# Patient Record
Sex: FEMALE | Race: ASIAN | NOT HISPANIC OR LATINO
[De-identification: names, ages, dates, MRNs, and addresses within clinical notes are randomized per-mention and may not be internally consistent; named-entity substitution may affect disease eponyms.]

---

## 2016-12-30 NOTE — ED CLERICAL - NS ED CLERK NOTE PRE-ARRIVAL INFORMATION; ADDITIONAL PRE-ARRIVAL INFORMATION
went to [facility] for gastric sleeve, pna, internal hemmorhage, signed out ama and flew home, white count 13,000, on levaquin, abcess on incision site

## 2016-12-30 NOTE — ED PROVIDER NOTE - OBJECTIVE STATEMENT
46yF with recent bariatric surgery in [place] (12/8) sent in by [facility] MD for concern for possible abdominal abscess. Patient reports several days of fever now with drainage at surgical site. Had complicated hospital course in [place] including ICU stay.

## 2016-12-30 NOTE — ED ADULT NURSE NOTE - OBJECTIVE STATEMENT
47 y/o female presents to the ED a&ox3 with c/o drainage around abdominal abscess  s/p gastric sleeve 12/8 in [place]. Pt since then has been having fever and diagnosed with pneumonia mid December. Returned from [place] 12/23. Pt seen PMD yesterday and sent in for ED. Respirations even and nonlabored. Lungs cta b/l. Denies any cp/sob. Abd soft nt nd +bsx4. +pulses +Cap refill. Low grade 99.2. No visible drainage around surgical site.

## 2016-12-30 NOTE — ED PROVIDER NOTE - PROGRESS NOTE DETAILS
Attd:  Patient signed out pending CT A/P and surgical evaluation.  CT demonstrated umbilical fat containing hernia, also perisplenic fluid collection - hematoma versus abscess.  Surgery evaluated patient in ED, will admit to their service for further workup of perisplenic collection.  Berhane Montanez M.D.

## 2016-12-30 NOTE — ED PROVIDER NOTE - MEDICAL DECISION MAKING DETAILS
ATTD: abd pain with abscess , concern for worsening abscess. check labs, check ct a/p, no pain currently and does not want pain medication.

## 2016-12-30 NOTE — ED PROVIDER NOTE - ATTENDING CONTRIBUTION TO CARE
47 y/o f with pmhx denies, presents for concern of abd pain and drainage from surgical site. Patient had bariatric surg in [place] on dec. 8th with complicated course leading to ICU admission and prolonged hospital stay discharged on dec. 21 AMA as patient wanted [place] come back to [place] and have more care. She was  seen by her pmd yesterday and sent to Er but waited until today to come in. Dr. Joan Mckay saw patient in office, sent over results. noetd to have high platelets and high protein, left pleural effusion. per patient and notes from MD patient was having fevers, .  had multiple transfusions as well during her hospital stay.   Gen. no acute distress, Non toxic   HEENT: EOMI, mmm,   Lungs: CTAB/L no C/ W /R   CVS: S1S2   Abd; Soft non tender, non distended   Ext: no edema   Neuro AAOx3 non focal clear speech

## 2016-12-31 NOTE — H&P ADULT. - HISTORY OF PRESENT ILLNESS
46 F presents with perisplenic collection. Patient underwent a single-incision laparoscopic sleeve gastrectomy in [place] on 12/8/16. Supposedly had a fall in the hospital. Was then discharged. Patient then returned to that hospital with SOB. Found to have L moderate pleural effusion and L subdiaphragmatic hemorrhagic collection. Underwent an ultrasound-guided aspiration of the L pleural effusion. Also received 1 U PRBC and 5 U FFP. Patient then left [facility] and returned to the [place] last week. She then saw her PCP, who noted a bump at her umbilicus, started her on Levaquin and sent her to the hospital for evaluation. Currently, she denies any fever or chills. Has been tolerating a diet. Denies abdominal pain, nausea, vomiting.

## 2016-12-31 NOTE — H&P ADULT. - GASTROINTESTINAL COMMENTS
Soft, ND, NTP. Lump noted above umbilicus. No skin changes. No drainage. Small incision in LUQ appears clean.

## 2016-12-31 NOTE — H&P ADULT. - ASSESSMENT
46 F s/p sleeve gastrectomy in [place] on 12/8/16. Developed a L pleural effusion and a subdiaphragmatic L hemorrhagic collection. Had ultrasound aspiration of L pleural effusion in [place]. Now presents with CT findings of a perisplenic collection, possibly an abscess. Possibly could be due to a leak. Will admit to [facility] ([facility]). Will start antibiotics.  -NPO  -IVF  -VTE prophylaxis  -Start IV antibiotics  -Will get upper GI  -D/w fellow  EDGARDO Parrish

## 2017-01-02 ENCOUNTER — TRANSCRIPTION ENCOUNTER (OUTPATIENT)
Age: 47
End: 2017-01-02

## 2017-01-02 NOTE — DISCHARGE NOTE ADULT - CARE PROVIDER_API CALL
Dennys Hall (MD), Surgery  [address] [place] [address]  Phone: [phone]  Fax: [phone]

## 2017-01-02 NOTE — DISCHARGE NOTE ADULT - PLAN OF CARE
complete antibiotics Interventional Radiology in one week.  Call their office [phone] for appointment, and schedule necessary imaging ie: CT scan of abdomen and pelvis if necessary, prior to follow up.  Please discuss with their office when any necessary imaging should be scheduled, when calling to schedule your follow up office appointment.    You will be discharged with ROYCE drain. You will need to empty them at least once daily and record the outputs accurately. This will be taught to you by the nursing staff.   Please do not remove the ROYCE drain.  Please empty and record output daily, and discard contents.  Please bring recorded results to your follow up appointment.  They will be removed in the office when clinically indicated.   Please bring to the office, the recorded results of the daily recorded output.    Follow up with Dr. Hall in 2 weeks.  Call [phone] for appointment.  Notify your surgeon and return to ER for temperatures greater than 101, chills sweats, pain not controlled with pain medications, persistent nausea and vomiting, or acutely concerning matters to you, that may require urgent medical attention.    Interventional Radiology in one week.  Call their office [phone] for appointment, and schedule necessary imaging ie: CT scan of abdomen and pelvis if necessary, prior to follow up.    You will be discharged with ROYCE drain. You will need to empty them at least once daily and record the outputs accurately. This will be taught to you by the nursing staff.   Please do not remove the ROYCE drain.  Please empty and record output daily, and discard contents.  Please bring recorded results to your follow up appointment.  They will be removed in the office when clinically indicated.   Please bring to the office, the recorded results of the daily recorded output. follow up Please follow up with your Primary Care Physician regarding your hospitalization, and schedule an appointment within two weeks after your discharge to review your hospital course.  Please  review all your current medications, and dose adjust as prescribed by your Primary Care Physician.  Call their office for appointment. complete antibiotics and follow up

## 2017-01-02 NOTE — DISCHARGE NOTE ADULT - HOSPITAL COURSE
46 F presents with perisplenic collection. Patient underwent a single-incision laparoscopic sleeve gastrectomy in [place] on 12/8/16. Supposedly had a fall in the hospital. Was then discharged. Patient then returned to that hospital with SOB. Found to have L moderate pleural effusion and L subdiaphragmatic hemorrhagic collection. Underwent an ultrasound-guided aspiration of the L pleural effusion. Also received 1 U PRBC and 5 U FFP. Patient then left [facility] and returned to the [place] last week. She then saw her PCP, who noted a bump at her umbilicus, started her on Levaquin and sent her to the hospital for evaluation. Currently, she denies any fever or chills. Has been tolerating a diet. Denies abdominal pain, nausea, vomiting.    Pt was admitted for symptomatic relief, pain control, IV fluids and further evaluation and treatment.  CT abd/pelvis showed nonspecific perisplenic collection. Concerning for abscess. Additional collections adjacent to liver.  Pt was placed on IV antibiotics.  On 12/31 she underwent percutaneous IR drainage, and tolerated procedure well.  Pt is tolerating a diet, voiding and ambulating.  Pt is ready for discharge in stable condition.  Pt will follow up with Dr. Hall, as an outpatient in one to two weeks and complete 7 days of oral antibiotics.

## 2017-01-02 NOTE — DISCHARGE NOTE ADULT - INSTRUCTIONS
Bariatric Phase I Full Liquid diet    Activity as tolerated. Avoid heavy lifting and heavy exercise. Follow up with MD. Monitor Drain site and record ROYCE drainage on sheet provided.

## 2017-01-02 NOTE — DISCHARGE NOTE ADULT - CARE PLAN
Principal Discharge DX:	Splenic abscess  Goal:	complete antibiotics  Instructions for follow-up, activity and diet:	Interventional Radiology in one week.  Call their office [phone] for appointment, and schedule necessary imaging ie: CT scan of abdomen and pelvis if necessary, prior to follow up.  Please discuss with their office when any necessary imaging should be scheduled, when calling to schedule your follow up office appointment.    You will be discharged with ROYCE drain. You will need to empty them at least once daily and record the outputs accurately. This will be taught to you by the nursing staff.   Please do not remove the ROYCE drain.  Please empty and record output daily, and discard contents.  Please bring recorded results to your follow up appointment.  They will be removed in the office when clinically indicated.   Please bring to the office, the recorded results of the daily recorded output.    Follow up with Dr. Hall in 2 weeks.  Call [phone] for appointment.  Notify your surgeon and return to ER for temperatures greater than 101, chills sweats, pain not controlled with pain medications, persistent nausea and vomiting, or acutely concerning matters to you, that may require urgent medical attention.    Interventional Radiology in one week.  Call their office [phone] for appointment, and schedule necessary imaging ie: CT scan of abdomen and pelvis if necessary, prior to follow up.    You will be discharged with ROYCE drain. You will need to empty them at least once daily and record the outputs accurately. This will be taught to you by the nursing staff.   Please do not remove the ROYCE drain.  Please empty and record output daily, and discard contents.  Please bring recorded results to your follow up appointment.  They will be removed in the office when clinically indicated.   Please bring to the office, the recorded results of the daily recorded output.  Secondary Diagnosis:	S/P laparoscopic sleeve gastrectomy  Goal:	follow up  Instructions for follow-up, activity and diet:	Please follow up with your Primary Care Physician regarding your hospitalization, and schedule an appointment within two weeks after your discharge to review your hospital course.  Please  review all your current medications, and dose adjust as prescribed by your Primary Care Physician.  Call their office for appointment.  Secondary Diagnosis:	OLINDA (obstructive sleep apnea)  Goal:	follow up  Instructions for follow-up, activity and diet:	Please follow up with your Primary Care Physician regarding your hospitalization, and schedule an appointment within two weeks after your discharge to review your hospital course.  Please  review all your current medications, and dose adjust as prescribed by your Primary Care Physician.  Call their office for appointment. Principal Discharge DX:	Splenic abscess  Goal:	complete antibiotics  Instructions for follow-up, activity and diet:	Interventional Radiology in one week.  Call their office [phone] for appointment, and schedule necessary imaging ie: CT scan of abdomen and pelvis if necessary, prior to follow up.  Please discuss with their office when any necessary imaging should be scheduled, when calling to schedule your follow up office appointment.    You will be discharged with ROYCE drain. You will need to empty them at least once daily and record the outputs accurately. This will be taught to you by the nursing staff.   Please do not remove the ROYCE drain.  Please empty and record output daily, and discard contents.  Please bring recorded results to your follow up appointment.  They will be removed in the office when clinically indicated.   Please bring to the office, the recorded results of the daily recorded output.    Follow up with Dr. Hall in 2 weeks.  Call [phone] for appointment.  Notify your surgeon and return to ER for temperatures greater than 101, chills sweats, pain not controlled with pain medications, persistent nausea and vomiting, or acutely concerning matters to you, that may require urgent medical attention.    Interventional Radiology in one week.  Call their office [phone] for appointment, and schedule necessary imaging ie: CT scan of abdomen and pelvis if necessary, prior to follow up.    You will be discharged with ROYCE drain. You will need to empty them at least once daily and record the outputs accurately. This will be taught to you by the nursing staff.   Please do not remove the ROYCE drain.  Please empty and record output daily, and discard contents.  Please bring recorded results to your follow up appointment.  They will be removed in the office when clinically indicated.   Please bring to the office, the recorded results of the daily recorded output.  Secondary Diagnosis:	S/P laparoscopic sleeve gastrectomy  Goal:	follow up  Instructions for follow-up, activity and diet:	Please follow up with your Primary Care Physician regarding your hospitalization, and schedule an appointment within two weeks after your discharge to review your hospital course.  Please  review all your current medications, and dose adjust as prescribed by your Primary Care Physician.  Call their office for appointment.  Secondary Diagnosis:	OLINDA (obstructive sleep apnea)  Goal:	follow up  Instructions for follow-up, activity and diet:	Please follow up with your Primary Care Physician regarding your hospitalization, and schedule an appointment within two weeks after your discharge to review your hospital course.  Please  review all your current medications, and dose adjust as prescribed by your Primary Care Physician.  Call their office for appointment. Principal Discharge DX:	Splenic abscess  Goal:	complete antibiotics and follow up  Instructions for follow-up, activity and diet:	Interventional Radiology in one week.  Call their office [phone] for appointment, and schedule necessary imaging ie: CT scan of abdomen and pelvis if necessary, prior to follow up.  Please discuss with their office when any necessary imaging should be scheduled, when calling to schedule your follow up office appointment.    You will be discharged with ROYCE drain. You will need to empty them at least once daily and record the outputs accurately. This will be taught to you by the nursing staff.   Please do not remove the ROYCE drain.  Please empty and record output daily, and discard contents.  Please bring recorded results to your follow up appointment.  They will be removed in the office when clinically indicated.   Please bring to the office, the recorded results of the daily recorded output.    Follow up with Dr. Hall in 2 weeks.  Call [phone] for appointment.  Notify your surgeon and return to ER for temperatures greater than 101, chills sweats, pain not controlled with pain medications, persistent nausea and vomiting, or acutely concerning matters to you, that may require urgent medical attention.    Interventional Radiology in one week.  Call their office [phone] for appointment, and schedule necessary imaging ie: CT scan of abdomen and pelvis if necessary, prior to follow up.    You will be discharged with ROYCE drain. You will need to empty them at least once daily and record the outputs accurately. This will be taught to you by the nursing staff.   Please do not remove the ROYCE drain.  Please empty and record output daily, and discard contents.  Please bring recorded results to your follow up appointment.  They will be removed in the office when clinically indicated.   Please bring to the office, the recorded results of the daily recorded output.  Secondary Diagnosis:	S/P laparoscopic sleeve gastrectomy  Goal:	follow up  Instructions for follow-up, activity and diet:	Please follow up with your Primary Care Physician regarding your hospitalization, and schedule an appointment within two weeks after your discharge to review your hospital course.  Please  review all your current medications, and dose adjust as prescribed by your Primary Care Physician.  Call their office for appointment.  Secondary Diagnosis:	OLINDA (obstructive sleep apnea)  Goal:	follow up  Instructions for follow-up, activity and diet:	Please follow up with your Primary Care Physician regarding your hospitalization, and schedule an appointment within two weeks after your discharge to review your hospital course.  Please  review all your current medications, and dose adjust as prescribed by your Primary Care Physician.  Call their office for appointment. Principal Discharge DX:	Splenic abscess  Goal:	complete antibiotics and follow up  Instructions for follow-up, activity and diet:	Interventional Radiology in one week.  Call their office [phone] for appointment, and schedule necessary imaging ie: CT scan of abdomen and pelvis if necessary, prior to follow up.  Please discuss with their office when any necessary imaging should be scheduled, when calling to schedule your follow up office appointment.    You will be discharged with ROYCE drain. You will need to empty them at least once daily and record the outputs accurately. This will be taught to you by the nursing staff.   Please do not remove the ROYCE drain.  Please empty and record output daily, and discard contents.  Please bring recorded results to your follow up appointment.  They will be removed in the office when clinically indicated.   Please bring to the office, the recorded results of the daily recorded output.    Follow up with Dr. Hall in 2 weeks.  Call [phone] for appointment.  Notify your surgeon and return to ER for temperatures greater than 101, chills sweats, pain not controlled with pain medications, persistent nausea and vomiting, or acutely concerning matters to you, that may require urgent medical attention.    Interventional Radiology in one week.  Call their office [phone] for appointment, and schedule necessary imaging ie: CT scan of abdomen and pelvis if necessary, prior to follow up.    You will be discharged with ROYCE drain. You will need to empty them at least once daily and record the outputs accurately. This will be taught to you by the nursing staff.   Please do not remove the ROYCE drain.  Please empty and record output daily, and discard contents.  Please bring recorded results to your follow up appointment.  They will be removed in the office when clinically indicated.   Please bring to the office, the recorded results of the daily recorded output.  Secondary Diagnosis:	S/P laparoscopic sleeve gastrectomy  Goal:	follow up  Instructions for follow-up, activity and diet:	Please follow up with your Primary Care Physician regarding your hospitalization, and schedule an appointment within two weeks after your discharge to review your hospital course.  Please  review all your current medications, and dose adjust as prescribed by your Primary Care Physician.  Call their office for appointment.  Secondary Diagnosis:	OLINDA (obstructive sleep apnea)  Goal:	follow up  Instructions for follow-up, activity and diet:	Please follow up with your Primary Care Physician regarding your hospitalization, and schedule an appointment within two weeks after your discharge to review your hospital course.  Please  review all your current medications, and dose adjust as prescribed by your Primary Care Physician.  Call their office for appointment. Principal Discharge DX:	Splenic abscess  Goal:	complete antibiotics and follow up  Instructions for follow-up, activity and diet:	Interventional Radiology in one week.  Call their office [phone] for appointment, and schedule necessary imaging ie: CT scan of abdomen and pelvis if necessary, prior to follow up.  Please discuss with their office when any necessary imaging should be scheduled, when calling to schedule your follow up office appointment.    You will be discharged with ROYCE drain. You will need to empty them at least once daily and record the outputs accurately. This will be taught to you by the nursing staff.   Please do not remove the ROYCE drain.  Please empty and record output daily, and discard contents.  Please bring recorded results to your follow up appointment.  They will be removed in the office when clinically indicated.   Please bring to the office, the recorded results of the daily recorded output.    Follow up with Dr. Hall in 2 weeks.  Call [phone] for appointment.  Notify your surgeon and return to ER for temperatures greater than 101, chills sweats, pain not controlled with pain medications, persistent nausea and vomiting, or acutely concerning matters to you, that may require urgent medical attention.    Interventional Radiology in one week.  Call their office [phone] for appointment, and schedule necessary imaging ie: CT scan of abdomen and pelvis if necessary, prior to follow up.    You will be discharged with ROYCE drain. You will need to empty them at least once daily and record the outputs accurately. This will be taught to you by the nursing staff.   Please do not remove the ROYCE drain.  Please empty and record output daily, and discard contents.  Please bring recorded results to your follow up appointment.  They will be removed in the office when clinically indicated.   Please bring to the office, the recorded results of the daily recorded output.  Secondary Diagnosis:	S/P laparoscopic sleeve gastrectomy  Goal:	follow up  Instructions for follow-up, activity and diet:	Please follow up with your Primary Care Physician regarding your hospitalization, and schedule an appointment within two weeks after your discharge to review your hospital course.  Please  review all your current medications, and dose adjust as prescribed by your Primary Care Physician.  Call their office for appointment.  Secondary Diagnosis:	OLINDA (obstructive sleep apnea)  Goal:	follow up  Instructions for follow-up, activity and diet:	Please follow up with your Primary Care Physician regarding your hospitalization, and schedule an appointment within two weeks after your discharge to review your hospital course.  Please  review all your current medications, and dose adjust as prescribed by your Primary Care Physician.  Call their office for appointment.

## 2017-01-02 NOTE — DISCHARGE NOTE ADULT - PATIENT PORTAL LINK FT
“You can access the [facility] Patient Portal, offered by [facility], by registering with the following website: [email or web address]”

## 2017-01-02 NOTE — DISCHARGE NOTE ADULT - OTHER SIGNIFICANT FINDINGS
See Radiographical Imaging Studies' results.    12/31 IR procedure Note:  SUCCESSFUL CT-GUIDED PERCUTANEOUS DRAINAGE OF PERISPLENIC COLLECTION WITH   8.3 [place] PIGTAIL CATHETER YIELDING APPROXIMATELY 3 CC 3 CC OF BLOODY   FLUID. SAMPLE OF FLUID SENT FOR MICROBIOLOGICAL ANAL

## 2017-01-02 NOTE — DISCHARGE NOTE ADULT - CARE PROVIDERS DIRECT ADDRESSES
[email or web address]

## 2017-01-02 NOTE — DISCHARGE NOTE ADULT - MEDICATION SUMMARY - MEDICATIONS TO TAKE
I will START or STAY ON the medications listed below when I get home from the hospital:    amoxicillin-clavulanate 875 mg-125 mg oral tablet  -- 1 tab(s) by mouth 2 times a day MDD:2  -- Finish all this medication unless otherwise directed by prescriber.  Take with food or milk.    -- Indication: For Antibiotic

## 2017-01-10 ENCOUNTER — FORM ENCOUNTER (OUTPATIENT)
Age: 47
End: 2017-01-10

## 2017-01-11 ENCOUNTER — OUTPATIENT (OUTPATIENT)
Dept: OUTPATIENT SERVICES | Facility: HOSPITAL | Age: 47
LOS: 1 days | End: 2017-01-11
Payer: MEDICAID

## 2017-01-11 ENCOUNTER — APPOINTMENT (OUTPATIENT)
Dept: CT IMAGING | Facility: HOSPITAL | Age: 47
End: 2017-01-11

## 2017-01-11 DIAGNOSIS — K65.1 PERITONEAL ABSCESS: ICD-10-CM

## 2017-01-11 DIAGNOSIS — Z98.84 BARIATRIC SURGERY STATUS: Chronic | ICD-10-CM

## 2017-01-11 PROCEDURE — 49424 ASSESS CYST CONTRAST INJECT: CPT

## 2017-01-11 PROCEDURE — 74150 CT ABDOMEN W/O CONTRAST: CPT | Mod: 26

## 2017-01-11 PROCEDURE — 76080 X-RAY EXAM OF FISTULA: CPT

## 2017-01-11 PROCEDURE — 76080 X-RAY EXAM OF FISTULA: CPT | Mod: 26

## 2017-01-11 PROCEDURE — 74150 CT ABDOMEN W/O CONTRAST: CPT

## 2017-01-17 DIAGNOSIS — K65.1 PERITONEAL ABSCESS: ICD-10-CM

## 2017-01-17 DIAGNOSIS — Z43.4 ENCOUNTER FOR ATTENTION TO OTHER ARTIFICIAL OPENINGS OF DIGESTIVE TRACT: ICD-10-CM

## 2017-01-24 ENCOUNTER — APPOINTMENT (OUTPATIENT)
Dept: SURGERY | Facility: CLINIC | Age: 47
End: 2017-01-24

## 2017-01-24 VITALS
RESPIRATION RATE: 15 BRPM | TEMPERATURE: 98.5 F | BODY MASS INDEX: 28.25 KG/M2 | WEIGHT: 149.5 LBS | SYSTOLIC BLOOD PRESSURE: 105 MMHG | HEART RATE: 90 BPM | DIASTOLIC BLOOD PRESSURE: 74 MMHG | OXYGEN SATURATION: 97 %

## 2017-01-24 DIAGNOSIS — R00.2 PALPITATIONS: ICD-10-CM

## 2017-01-24 DIAGNOSIS — G47.30 SLEEP APNEA, UNSPECIFIED: ICD-10-CM

## 2017-01-24 DIAGNOSIS — R60.0 LOCALIZED EDEMA: ICD-10-CM

## 2017-01-24 DIAGNOSIS — Z82.5 FAMILY HISTORY OF ASTHMA AND OTHER CHRONIC LOWER RESPIRATORY DISEASES: ICD-10-CM

## 2017-01-24 DIAGNOSIS — Z98.84 BARIATRIC SURGERY STATUS: ICD-10-CM

## 2017-03-24 ENCOUNTER — APPOINTMENT (OUTPATIENT)
Dept: SURGERY | Facility: CLINIC | Age: 47
End: 2017-03-24

## 2018-01-19 ENCOUNTER — APPOINTMENT (OUTPATIENT)
Dept: SURGERY | Facility: CLINIC | Age: 48
End: 2018-01-19

## 2018-03-12 ENCOUNTER — RX RENEWAL (OUTPATIENT)
Age: 48
End: 2018-03-12

## 2018-04-09 ENCOUNTER — APPOINTMENT (OUTPATIENT)
Dept: SURGERY | Facility: CLINIC | Age: 48
End: 2018-04-09
Payer: MEDICAID

## 2018-04-09 VITALS
TEMPERATURE: 98.1 F | RESPIRATION RATE: 16 BRPM | SYSTOLIC BLOOD PRESSURE: 129 MMHG | HEART RATE: 97 BPM | OXYGEN SATURATION: 97 % | DIASTOLIC BLOOD PRESSURE: 84 MMHG

## 2018-04-09 DIAGNOSIS — Z90.3 ACQUIRED ABSENCE OF STOMACH [PART OF]: ICD-10-CM

## 2018-04-09 DIAGNOSIS — K21.9 GASTRO-ESOPHAGEAL REFLUX DISEASE W/OUT ESOPHAGITIS: ICD-10-CM

## 2018-04-09 DIAGNOSIS — R05 COUGH: ICD-10-CM

## 2018-04-09 PROCEDURE — 99214 OFFICE O/P EST MOD 30 MIN: CPT

## 2018-04-09 RX ORDER — ESOMEPRAZOLE MAGNESIUM 40 MG/1
40 CAPSULE, DELAYED RELEASE ORAL DAILY
Qty: 90 | Refills: 0 | Status: ACTIVE | COMMUNITY
Start: 2018-04-09 | End: 1900-01-01

## 2018-04-09 RX ORDER — OMEPRAZOLE 40 MG/1
40 CAPSULE, DELAYED RELEASE ORAL TWICE DAILY
Qty: 60 | Refills: 9 | Status: ACTIVE | COMMUNITY
Start: 2017-01-24 | End: 1900-01-01

## 2018-04-09 RX ORDER — BUDESONIDE AND FORMOTEROL FUMARATE DIHYDRATE 160; 4.5 UG/1; UG/1
AEROSOL RESPIRATORY (INHALATION)
Refills: 0 | Status: DISCONTINUED | COMMUNITY
End: 2018-04-09

## 2018-04-09 RX ORDER — ALBUTEROL SULFATE 2.5 MG/3ML
0.08 VIAL, NEBULIZER (ML) INHALATION
Refills: 0 | Status: DISCONTINUED | COMMUNITY
End: 2018-04-09

## 2018-06-15 ENCOUNTER — APPOINTMENT (OUTPATIENT)
Dept: SURGERY | Facility: CLINIC | Age: 48
End: 2018-06-15

## 2018-07-09 ENCOUNTER — APPOINTMENT (OUTPATIENT)
Dept: SURGERY | Facility: CLINIC | Age: 48
End: 2018-07-09

## 2018-09-03 PROBLEM — R60.0 BILATERAL EDEMA OF LOWER EXTREMITY: Status: ACTIVE | Noted: 2017-01-24

## 2019-04-01 ENCOUNTER — APPOINTMENT (OUTPATIENT)
Dept: SURGERY | Facility: CLINIC | Age: 49
End: 2019-04-01

## 2020-09-11 ENCOUNTER — OUTPATIENT (OUTPATIENT)
Dept: OUTPATIENT SERVICES | Facility: HOSPITAL | Age: 50
LOS: 1 days | Discharge: HOME | End: 2020-09-11

## 2020-09-11 DIAGNOSIS — Z98.84 BARIATRIC SURGERY STATUS: Chronic | ICD-10-CM

## 2020-09-11 DIAGNOSIS — D64.9 ANEMIA, UNSPECIFIED: ICD-10-CM

## 2020-09-14 ENCOUNTER — APPOINTMENT (OUTPATIENT)
Dept: HEMATOLOGY ONCOLOGY | Facility: CLINIC | Age: 50
End: 2020-09-14
Payer: MEDICAID

## 2020-09-14 VITALS
WEIGHT: 140.63 LBS | DIASTOLIC BLOOD PRESSURE: 83 MMHG | BODY MASS INDEX: 25.88 KG/M2 | OXYGEN SATURATION: 99 % | TEMPERATURE: 98.2 F | SYSTOLIC BLOOD PRESSURE: 125 MMHG | HEART RATE: 95 BPM | HEIGHT: 61.93 IN | RESPIRATION RATE: 16 BRPM

## 2020-09-14 DIAGNOSIS — D50.9 IRON DEFICIENCY ANEMIA, UNSPECIFIED: ICD-10-CM

## 2020-09-14 PROCEDURE — 99204 OFFICE O/P NEW MOD 45 MIN: CPT

## 2020-09-14 NOTE — HISTORY OF PRESENT ILLNESS
[de-identified] : This is a 50 year old woman with a history of hyperthyroidism, now on surveillance, and off PTU, history of thalassemia minor. She had learned of this diagnosis after her niece was diagnosed with thalassemia major.  Baseline Hg 11.5 in 2016.  Patient had a bariatric surgery, Gastric sleeve in 2016, became severely anemic, Hg 6.4 back then and required transfusions, since that time she had a steadily declining hemoglobin in the 10's range and more recently on August 3rd came down to 9.7g/dl.   MCV marginally lower than baseline at 61fl\par 2 years ago hair started falling out.  Also red blood spots on the leg that came and went. Has pictures consistent with a folliculitis.  \par Does eat meat.  Has tried an iron pill on and off.  Has taken it back, was a 125mg liquid iron supplement.   While using this she is symptomatic with severe constipation. She manages to take it a couple of times a week. \par Menstrual periods stopped about a year ago.  \par \par She did suffer from COVID 19 infection in April. Was managed as an outpatient.  Weathered through it.  Much of her current symptomatology of fatigue, weakness, dyspnea had started after that event.

## 2020-09-14 NOTE — ASSESSMENT
[FreeTextEntry1] : This is a 50 year old woman with a history of hyperthyroidism,, gastric sleeve surgery in 2016, COVID 19 infection April 2020. Acute on chronic anemia, baseline Hg 11.5g/dl with Thalassemia minor, compounded with an iron deficiency, Hg 9.7g/l with a ferritin of 9.  Unable to tolerate PO iron more than 1-2 times a week secondary to GI toxicity.  Total body iron deficit assuming baseline Hg 11.5g/dl 770mg, would round down given her thalassemia minor status, could run the risk of iron overload. Will have her schedule for 3 doses of IV venofer 200mg.  Follow up in 6 months for redosing if necessary.

## 2020-09-14 NOTE — REVIEW OF SYSTEMS
[Negative] : Musculoskeletal [FreeTextEntry2] : Severe weakness and fatigue.   [FreeTextEntry6] : Dyspnea on exertion.

## 2020-09-15 LAB — SARS-COV-2 N GENE NPH QL NAA+PROBE: NOT DETECTED

## 2020-09-17 ENCOUNTER — APPOINTMENT (OUTPATIENT)
Dept: INFUSION THERAPY | Facility: HOSPITAL | Age: 50
End: 2020-09-17

## 2020-09-17 ENCOUNTER — APPOINTMENT (OUTPATIENT)
Dept: HEMATOLOGY ONCOLOGY | Facility: CLINIC | Age: 50
End: 2020-09-17
Payer: MEDICAID

## 2020-09-17 DIAGNOSIS — D50.9 IRON DEFICIENCY ANEMIA, UNSPECIFIED: ICD-10-CM

## 2020-09-17 DIAGNOSIS — T80.1XXA VASCULAR COMPLICATIONS FOLLOWING INFUSION, TRANSFUSION AND THERAPEUTIC INJECTION, INITIAL ENCOUNTER: ICD-10-CM

## 2020-09-17 PROCEDURE — 99212 OFFICE O/P EST SF 10 MIN: CPT

## 2020-09-18 PROBLEM — T80.1XXA IV INFILTRATION, INITIAL ENCOUNTER: Status: ACTIVE | Noted: 2020-09-18

## 2020-09-24 ENCOUNTER — APPOINTMENT (OUTPATIENT)
Dept: INFUSION THERAPY | Facility: HOSPITAL | Age: 50
End: 2020-09-24

## 2020-10-01 ENCOUNTER — LABORATORY RESULT (OUTPATIENT)
Age: 50
End: 2020-10-01

## 2020-10-01 ENCOUNTER — APPOINTMENT (OUTPATIENT)
Dept: INFUSION THERAPY | Facility: HOSPITAL | Age: 50
End: 2020-10-01

## 2021-02-18 ENCOUNTER — OUTPATIENT (OUTPATIENT)
Dept: OUTPATIENT SERVICES | Facility: HOSPITAL | Age: 51
LOS: 1 days | Discharge: HOME | End: 2021-02-18

## 2021-02-18 DIAGNOSIS — D64.9 ANEMIA, UNSPECIFIED: ICD-10-CM

## 2021-02-18 DIAGNOSIS — Z98.84 BARIATRIC SURGERY STATUS: Chronic | ICD-10-CM

## 2021-02-19 ENCOUNTER — RESULT REVIEW (OUTPATIENT)
Age: 51
End: 2021-02-19

## 2021-02-19 ENCOUNTER — LABORATORY RESULT (OUTPATIENT)
Age: 51
End: 2021-02-19

## 2021-02-19 ENCOUNTER — APPOINTMENT (OUTPATIENT)
Dept: HEMATOLOGY ONCOLOGY | Facility: CLINIC | Age: 51
End: 2021-02-19
Payer: MEDICAID

## 2021-02-19 VITALS
HEART RATE: 105 BPM | RESPIRATION RATE: 16 BRPM | DIASTOLIC BLOOD PRESSURE: 83 MMHG | HEIGHT: 62.99 IN | BODY MASS INDEX: 25.51 KG/M2 | TEMPERATURE: 97.8 F | SYSTOLIC BLOOD PRESSURE: 136 MMHG | WEIGHT: 143.96 LBS | OXYGEN SATURATION: 98 %

## 2021-02-19 DIAGNOSIS — D64.9 ANEMIA, UNSPECIFIED: ICD-10-CM

## 2021-02-19 DIAGNOSIS — E61.1 IRON DEFICIENCY: ICD-10-CM

## 2021-02-19 LAB
BASOPHILS # BLD AUTO: 0.05 K/UL — SIGNIFICANT CHANGE UP (ref 0–0.2)
BASOPHILS NFR BLD AUTO: 0.6 % — SIGNIFICANT CHANGE UP (ref 0–2)
EOSINOPHIL # BLD AUTO: 0.19 K/UL — SIGNIFICANT CHANGE UP (ref 0–0.5)
EOSINOPHIL NFR BLD AUTO: 2.4 % — SIGNIFICANT CHANGE UP (ref 0–6)
HCT VFR BLD CALC: 33.3 % — LOW (ref 34.5–45)
HGB BLD-MCNC: 10.4 G/DL — LOW (ref 11.5–15.5)
IMM GRANULOCYTES NFR BLD AUTO: 0.4 % — SIGNIFICANT CHANGE UP (ref 0–1.5)
LYMPHOCYTES # BLD AUTO: 2.41 K/UL — SIGNIFICANT CHANGE UP (ref 1–3.3)
LYMPHOCYTES # BLD AUTO: 30 % — SIGNIFICANT CHANGE UP (ref 13–44)
MCHC RBC-ENTMCNC: 18.5 PG — LOW (ref 27–34)
MCHC RBC-ENTMCNC: 31.2 G/DL — LOW (ref 32–36)
MCV RBC AUTO: 59.4 FL — LOW (ref 80–100)
MONOCYTES # BLD AUTO: 0.48 K/UL — SIGNIFICANT CHANGE UP (ref 0–0.9)
MONOCYTES NFR BLD AUTO: 6 % — SIGNIFICANT CHANGE UP (ref 2–14)
NEUTROPHILS # BLD AUTO: 4.86 K/UL — SIGNIFICANT CHANGE UP (ref 1.8–7.4)
NEUTROPHILS NFR BLD AUTO: 60.6 % — SIGNIFICANT CHANGE UP (ref 43–77)
NRBC # BLD: 0 /100 WBCS — SIGNIFICANT CHANGE UP (ref 0–0)
PLATELET # BLD AUTO: 456 K/UL — HIGH (ref 150–400)
RBC # BLD: 5.61 M/UL — HIGH (ref 3.8–5.2)
RBC # FLD: 17.6 % — HIGH (ref 10.3–14.5)
RETICS #: 103 K/UL — SIGNIFICANT CHANGE UP (ref 25–125)
RETICS/RBC NFR: 1.8 % — SIGNIFICANT CHANGE UP (ref 0.5–2.5)
WBC # BLD: 8.02 K/UL — SIGNIFICANT CHANGE UP (ref 3.8–10.5)
WBC # FLD AUTO: 8.02 K/UL — SIGNIFICANT CHANGE UP (ref 3.8–10.5)

## 2021-02-19 PROCEDURE — 99072 ADDL SUPL MATRL&STAF TM PHE: CPT

## 2021-02-19 PROCEDURE — 99213 OFFICE O/P EST LOW 20 MIN: CPT

## 2021-02-21 NOTE — HISTORY OF PRESENT ILLNESS
[de-identified] : Patient had a history of iron deficiency anemia.  Patient lost her job and stated working again.  Shoe does however experience significant fatigue and weakness just as she did last year when she lost her previous job in the middle of a severe bout of iron deficiency.  Hg today 10.4 with an MCV 59.4fl.  \par \par Also on adderall on days of work, but lately she had been on it every day.  Did not take a dose yesterday, and felt very week and tired. She restarted her iron tablets.  \par

## 2021-02-21 NOTE — ASSESSMENT
[FreeTextEntry1] : 50 year old woman with a microcytic anemia treated with IV venofer in the past for HG 8.7g/dl. Patient felt significantly better however she returns with similar symptoms. Hg 10.4g/dl with MCV 59.4fl suggestive of recurrent iron deficiency. Ferritin 110 however suggests that she may also have an underlying hemoglobinopathy. Will administer 600mg of parenteral iron by IV venofer starting next week.  Explained to her that this anemia is mild with a hg 10.4, and the fatigue may not be coming from the anemia itself.  Asked her to consider cutting back her work hours a little, or seeing her psychiatrist for discussion about the adderall dosing, and to try to get additional sleep.

## 2021-02-22 PROBLEM — E61.1 IRON DEFICIENCY: Status: ACTIVE | Noted: 2020-09-14

## 2021-02-25 ENCOUNTER — LABORATORY RESULT (OUTPATIENT)
Age: 51
End: 2021-02-25

## 2021-02-25 ENCOUNTER — APPOINTMENT (OUTPATIENT)
Dept: INFUSION THERAPY | Facility: HOSPITAL | Age: 51
End: 2021-02-25

## 2021-02-25 ENCOUNTER — RESULT REVIEW (OUTPATIENT)
Age: 51
End: 2021-02-25

## 2021-02-25 DIAGNOSIS — D50.9 IRON DEFICIENCY ANEMIA, UNSPECIFIED: ICD-10-CM

## 2021-02-25 LAB
BASOPHILS # BLD AUTO: 0.07 K/UL — SIGNIFICANT CHANGE UP (ref 0–0.2)
BASOPHILS NFR BLD AUTO: 1.1 % — SIGNIFICANT CHANGE UP (ref 0–2)
EOSINOPHIL # BLD AUTO: 0.26 K/UL — SIGNIFICANT CHANGE UP (ref 0–0.5)
EOSINOPHIL NFR BLD AUTO: 3.9 % — SIGNIFICANT CHANGE UP (ref 0–6)
HCT VFR BLD CALC: 32.1 % — LOW (ref 34.5–45)
HGB BLD-MCNC: 9.9 G/DL — LOW (ref 11.5–15.5)
IMM GRANULOCYTES NFR BLD AUTO: 0.3 % — SIGNIFICANT CHANGE UP (ref 0–1.5)
LYMPHOCYTES # BLD AUTO: 1.95 K/UL — SIGNIFICANT CHANGE UP (ref 1–3.3)
LYMPHOCYTES # BLD AUTO: 29.4 % — SIGNIFICANT CHANGE UP (ref 13–44)
MCHC RBC-ENTMCNC: 18.5 PG — LOW (ref 27–34)
MCHC RBC-ENTMCNC: 30.8 G/DL — LOW (ref 32–36)
MCV RBC AUTO: 59.9 FL — LOW (ref 80–100)
MONOCYTES # BLD AUTO: 0.49 K/UL — SIGNIFICANT CHANGE UP (ref 0–0.9)
MONOCYTES NFR BLD AUTO: 7.4 % — SIGNIFICANT CHANGE UP (ref 2–14)
NEUTROPHILS # BLD AUTO: 3.84 K/UL — SIGNIFICANT CHANGE UP (ref 1.8–7.4)
NEUTROPHILS NFR BLD AUTO: 57.9 % — SIGNIFICANT CHANGE UP (ref 43–77)
NRBC # BLD: 0 /100 WBCS — SIGNIFICANT CHANGE UP (ref 0–0)
PLATELET # BLD AUTO: 475 K/UL — HIGH (ref 150–400)
RBC # BLD: 5.36 M/UL — HIGH (ref 3.8–5.2)
RBC # FLD: 16.6 % — HIGH (ref 10.3–14.5)
WBC # BLD: 6.63 K/UL — SIGNIFICANT CHANGE UP (ref 3.8–10.5)
WBC # FLD AUTO: 6.63 K/UL — SIGNIFICANT CHANGE UP (ref 3.8–10.5)

## 2021-02-26 LAB
ALBUMIN SERPL ELPH-MCNC: 4.6 G/DL
ALP BLD-CCNC: 78 U/L
ALT SERPL-CCNC: 14 U/L
ANION GAP SERPL CALC-SCNC: 12 MMOL/L
AST SERPL-CCNC: 18 U/L
BILIRUB SERPL-MCNC: 0.2 MG/DL
BUN SERPL-MCNC: 16 MG/DL
CALCIUM SERPL-MCNC: 9.8 MG/DL
CHLORIDE SERPL-SCNC: 105 MMOL/L
CO2 SERPL-SCNC: 24 MMOL/L
CREAT SERPL-MCNC: 0.71 MG/DL
FERRITIN SERPL-MCNC: 110 NG/ML
FOLATE SERPL-MCNC: 13.7 NG/ML
GLUCOSE SERPL-MCNC: 93 MG/DL
IRON SATN MFR SERPL: 17 %
IRON SERPL-MCNC: 52 UG/DL
POTASSIUM SERPL-SCNC: 4.7 MMOL/L
PROT SERPL-MCNC: 7.5 G/DL
SODIUM SERPL-SCNC: 141 MMOL/L
TIBC SERPL-MCNC: 314 UG/DL
UIBC SERPL-MCNC: 261 UG/DL
VIT B12 SERPL-MCNC: 940 PG/ML

## 2021-03-05 DIAGNOSIS — D56.3 THALASSEMIA MINOR: ICD-10-CM

## 2021-03-08 ENCOUNTER — LABORATORY RESULT (OUTPATIENT)
Age: 51
End: 2021-03-08

## 2021-03-15 ENCOUNTER — APPOINTMENT (OUTPATIENT)
Dept: HEMATOLOGY ONCOLOGY | Facility: CLINIC | Age: 51
End: 2021-03-15

## 2021-03-16 ENCOUNTER — LABORATORY RESULT (OUTPATIENT)
Age: 51
End: 2021-03-16

## 2021-03-16 ENCOUNTER — APPOINTMENT (OUTPATIENT)
Dept: INFUSION THERAPY | Facility: HOSPITAL | Age: 51
End: 2021-03-16

## 2021-03-17 ENCOUNTER — NON-APPOINTMENT (OUTPATIENT)
Age: 51
End: 2021-03-17

## 2021-03-18 ENCOUNTER — OUTPATIENT (OUTPATIENT)
Dept: OUTPATIENT SERVICES | Facility: HOSPITAL | Age: 51
LOS: 1 days | Discharge: HOME | End: 2021-03-18

## 2021-03-18 DIAGNOSIS — D64.9 ANEMIA, UNSPECIFIED: ICD-10-CM

## 2021-03-18 DIAGNOSIS — Z98.84 BARIATRIC SURGERY STATUS: Chronic | ICD-10-CM

## 2021-03-29 ENCOUNTER — APPOINTMENT (OUTPATIENT)
Dept: HEMATOLOGY ONCOLOGY | Facility: CLINIC | Age: 51
End: 2021-03-29

## 2021-03-30 ENCOUNTER — TRANSCRIPTION ENCOUNTER (OUTPATIENT)
Age: 51
End: 2021-03-30

## 2021-04-06 ENCOUNTER — APPOINTMENT (OUTPATIENT)
Dept: INFUSION THERAPY | Facility: HOSPITAL | Age: 51
End: 2021-04-06

## 2021-04-14 ENCOUNTER — APPOINTMENT (OUTPATIENT)
Dept: INFUSION THERAPY | Facility: HOSPITAL | Age: 51
End: 2021-04-14

## 2021-04-14 ENCOUNTER — LABORATORY RESULT (OUTPATIENT)
Age: 51
End: 2021-04-14

## 2021-05-06 ENCOUNTER — OUTPATIENT (OUTPATIENT)
Dept: OUTPATIENT SERVICES | Facility: HOSPITAL | Age: 51
LOS: 1 days | Discharge: HOME | End: 2021-05-06

## 2021-05-06 DIAGNOSIS — D64.9 ANEMIA, UNSPECIFIED: ICD-10-CM

## 2021-05-06 DIAGNOSIS — Z98.84 BARIATRIC SURGERY STATUS: Chronic | ICD-10-CM

## 2021-05-11 ENCOUNTER — RESULT REVIEW (OUTPATIENT)
Age: 51
End: 2021-05-11

## 2021-05-11 ENCOUNTER — APPOINTMENT (OUTPATIENT)
Dept: INFUSION THERAPY | Facility: HOSPITAL | Age: 51
End: 2021-05-11

## 2021-05-11 ENCOUNTER — LABORATORY RESULT (OUTPATIENT)
Age: 51
End: 2021-05-11

## 2021-05-11 DIAGNOSIS — D50.9 IRON DEFICIENCY ANEMIA, UNSPECIFIED: ICD-10-CM

## 2021-05-11 LAB
BASOPHILS # BLD AUTO: 0.08 K/UL — SIGNIFICANT CHANGE UP (ref 0–0.2)
BASOPHILS NFR BLD AUTO: 0.9 % — SIGNIFICANT CHANGE UP (ref 0–2)
EOSINOPHIL # BLD AUTO: 0.19 K/UL — SIGNIFICANT CHANGE UP (ref 0–0.5)
EOSINOPHIL NFR BLD AUTO: 2.1 % — SIGNIFICANT CHANGE UP (ref 0–6)
HCT VFR BLD CALC: 34.2 % — LOW (ref 34.5–45)
HGB BLD-MCNC: 10.5 G/DL — LOW (ref 11.5–15.5)
IMM GRANULOCYTES NFR BLD AUTO: 0.4 % — SIGNIFICANT CHANGE UP (ref 0–1.5)
LYMPHOCYTES # BLD AUTO: 3.11 K/UL — SIGNIFICANT CHANGE UP (ref 1–3.3)
LYMPHOCYTES # BLD AUTO: 34.7 % — SIGNIFICANT CHANGE UP (ref 13–44)
MCHC RBC-ENTMCNC: 18.4 PG — LOW (ref 27–34)
MCHC RBC-ENTMCNC: 30.7 G/DL — LOW (ref 32–36)
MCV RBC AUTO: 59.8 FL — LOW (ref 80–100)
MONOCYTES # BLD AUTO: 0.58 K/UL — SIGNIFICANT CHANGE UP (ref 0–0.9)
MONOCYTES NFR BLD AUTO: 6.5 % — SIGNIFICANT CHANGE UP (ref 2–14)
NEUTROPHILS # BLD AUTO: 4.97 K/UL — SIGNIFICANT CHANGE UP (ref 1.8–7.4)
NEUTROPHILS NFR BLD AUTO: 55.4 % — SIGNIFICANT CHANGE UP (ref 43–77)
NRBC # BLD: 0 /100 WBCS — SIGNIFICANT CHANGE UP (ref 0–0)
PLATELET # BLD AUTO: 550 K/UL — HIGH (ref 150–400)
RBC # BLD: 5.72 M/UL — HIGH (ref 3.8–5.2)
RBC # FLD: 18.2 % — HIGH (ref 10.3–14.5)
WBC # BLD: 8.97 K/UL — SIGNIFICANT CHANGE UP (ref 3.8–10.5)
WBC # FLD AUTO: 8.97 K/UL — SIGNIFICANT CHANGE UP (ref 3.8–10.5)

## 2021-05-18 ENCOUNTER — LABORATORY RESULT (OUTPATIENT)
Age: 51
End: 2021-05-18

## 2021-05-18 ENCOUNTER — RESULT REVIEW (OUTPATIENT)
Age: 51
End: 2021-05-18

## 2021-05-18 ENCOUNTER — APPOINTMENT (OUTPATIENT)
Dept: INFUSION THERAPY | Facility: HOSPITAL | Age: 51
End: 2021-05-18

## 2021-05-18 LAB
BASOPHILS # BLD AUTO: 0.06 K/UL — SIGNIFICANT CHANGE UP (ref 0–0.2)
BASOPHILS NFR BLD AUTO: 0.8 % — SIGNIFICANT CHANGE UP (ref 0–2)
EOSINOPHIL # BLD AUTO: 0.16 K/UL — SIGNIFICANT CHANGE UP (ref 0–0.5)
EOSINOPHIL NFR BLD AUTO: 2 % — SIGNIFICANT CHANGE UP (ref 0–6)
HCT VFR BLD CALC: 30.6 % — LOW (ref 34.5–45)
HGB BLD-MCNC: 9.4 G/DL — LOW (ref 11.5–15.5)
IMM GRANULOCYTES NFR BLD AUTO: 0.4 % — SIGNIFICANT CHANGE UP (ref 0–1.5)
LYMPHOCYTES # BLD AUTO: 2.33 K/UL — SIGNIFICANT CHANGE UP (ref 1–3.3)
LYMPHOCYTES # BLD AUTO: 29.8 % — SIGNIFICANT CHANGE UP (ref 13–44)
MCHC RBC-ENTMCNC: 18.4 PG — LOW (ref 27–34)
MCHC RBC-ENTMCNC: 30.7 G/DL — LOW (ref 32–36)
MCV RBC AUTO: 59.8 FL — LOW (ref 80–100)
MONOCYTES # BLD AUTO: 0.5 K/UL — SIGNIFICANT CHANGE UP (ref 0–0.9)
MONOCYTES NFR BLD AUTO: 6.4 % — SIGNIFICANT CHANGE UP (ref 2–14)
NEUTROPHILS # BLD AUTO: 4.75 K/UL — SIGNIFICANT CHANGE UP (ref 1.8–7.4)
NEUTROPHILS NFR BLD AUTO: 60.6 % — SIGNIFICANT CHANGE UP (ref 43–77)
NRBC # BLD: 0 /100 WBCS — SIGNIFICANT CHANGE UP (ref 0–0)
PLATELET # BLD AUTO: 460 K/UL — HIGH (ref 150–400)
RBC # BLD: 5.12 M/UL — SIGNIFICANT CHANGE UP (ref 3.8–5.2)
RBC # FLD: 17.9 % — HIGH (ref 10.3–14.5)
WBC # BLD: 7.83 K/UL — SIGNIFICANT CHANGE UP (ref 3.8–10.5)
WBC # FLD AUTO: 7.83 K/UL — SIGNIFICANT CHANGE UP (ref 3.8–10.5)

## 2021-05-27 ENCOUNTER — RESULT REVIEW (OUTPATIENT)
Age: 51
End: 2021-05-27

## 2021-05-27 ENCOUNTER — APPOINTMENT (OUTPATIENT)
Dept: HEMATOLOGY ONCOLOGY | Facility: CLINIC | Age: 51
End: 2021-05-27
Payer: MEDICAID

## 2021-05-27 VITALS
RESPIRATION RATE: 14 BRPM | SYSTOLIC BLOOD PRESSURE: 141 MMHG | HEIGHT: 62.2 IN | TEMPERATURE: 97.2 F | HEART RATE: 97 BPM | OXYGEN SATURATION: 99 % | WEIGHT: 140.41 LBS | BODY MASS INDEX: 25.51 KG/M2 | DIASTOLIC BLOOD PRESSURE: 83 MMHG

## 2021-05-27 LAB
BASOPHILS # BLD AUTO: 0.07 K/UL — SIGNIFICANT CHANGE UP (ref 0–0.2)
BASOPHILS NFR BLD AUTO: 1 % — SIGNIFICANT CHANGE UP (ref 0–2)
EOSINOPHIL # BLD AUTO: 0.2 K/UL — SIGNIFICANT CHANGE UP (ref 0–0.5)
EOSINOPHIL NFR BLD AUTO: 2.9 % — SIGNIFICANT CHANGE UP (ref 0–6)
HCT VFR BLD CALC: 32.9 % — LOW (ref 34.5–45)
HGB BLD-MCNC: 10 G/DL — LOW (ref 11.5–15.5)
IMM GRANULOCYTES NFR BLD AUTO: 0.4 % — SIGNIFICANT CHANGE UP (ref 0–1.5)
LYMPHOCYTES # BLD AUTO: 1.9 K/UL — SIGNIFICANT CHANGE UP (ref 1–3.3)
LYMPHOCYTES # BLD AUTO: 27.3 % — SIGNIFICANT CHANGE UP (ref 13–44)
MCHC RBC-ENTMCNC: 18.1 PG — LOW (ref 27–34)
MCHC RBC-ENTMCNC: 30.4 G/DL — LOW (ref 32–36)
MCV RBC AUTO: 59.6 FL — LOW (ref 80–100)
MONOCYTES # BLD AUTO: 0.53 K/UL — SIGNIFICANT CHANGE UP (ref 0–0.9)
MONOCYTES NFR BLD AUTO: 7.6 % — SIGNIFICANT CHANGE UP (ref 2–14)
NEUTROPHILS # BLD AUTO: 4.22 K/UL — SIGNIFICANT CHANGE UP (ref 1.8–7.4)
NEUTROPHILS NFR BLD AUTO: 60.8 % — SIGNIFICANT CHANGE UP (ref 43–77)
NRBC # BLD: 0 /100 WBCS — SIGNIFICANT CHANGE UP (ref 0–0)
PLATELET # BLD AUTO: 439 K/UL — HIGH (ref 150–400)
RBC # BLD: 5.52 M/UL — HIGH (ref 3.8–5.2)
RBC # FLD: 18.6 % — HIGH (ref 10.3–14.5)
RETICS #: 116.3 K/UL — SIGNIFICANT CHANGE UP (ref 25–125)
RETICS/RBC NFR: 2.1 % — SIGNIFICANT CHANGE UP (ref 0.5–2.5)
WBC # BLD: 6.95 K/UL — SIGNIFICANT CHANGE UP (ref 3.8–10.5)
WBC # FLD AUTO: 6.95 K/UL — SIGNIFICANT CHANGE UP (ref 3.8–10.5)

## 2021-05-27 PROCEDURE — 99214 OFFICE O/P EST MOD 30 MIN: CPT

## 2021-05-27 PROCEDURE — 99072 ADDL SUPL MATRL&STAF TM PHE: CPT

## 2021-05-30 NOTE — ASSESSMENT
[FreeTextEntry1] : This citlali 51 year old woman with a history of iron deficiency, severe fatigue.  Was treated wit HIV iron ferritin thee as expected to 350, slight iron overload, but this is unlikely to cause any symptomatology. For some reason Hg would not improve to > 10g/dl this could be related to her history of thalassemia. In any case, Hg did impose however patient does not feel any better.  Informed her that is is unlikely that her symptoms of fatigue are related to her iron deficiency or anemia at all.  Reccomended she try to get some more rest.

## 2021-05-30 NOTE — HISTORY OF PRESENT ILLNESS
[de-identified] : Patient had a history of iron deficiency anemia.  Patient lost her job and stated working again.  Shoe does however experience significant fatigue and weakness just as she did last year when she lost her previous job in the middle of a severe bout of iron deficiency.  Hg today 10.4 with an MCV 59.4fl.  \par \par Also on adderall on days of work, but lately she had been on it every day.  Did not take a dose yesterday, and felt very week and tired. She restarted her iron tablets.  \par  [de-identified] : Following IV venofer infusions, Hg improved but only to 10.0g/dl.  Ferritin improved to 350.

## 2021-06-03 LAB
ALBUMIN SERPL ELPH-MCNC: 4.7 G/DL
ALP BLD-CCNC: 96 U/L
ALT SERPL-CCNC: 31 U/L
ANION GAP SERPL CALC-SCNC: 11 MMOL/L
AST SERPL-CCNC: 35 U/L
BILIRUB SERPL-MCNC: 0.4 MG/DL
BUN SERPL-MCNC: 19 MG/DL
CALCIUM SERPL-MCNC: 10.1 MG/DL
CHLORIDE SERPL-SCNC: 104 MMOL/L
CO2 SERPL-SCNC: 25 MMOL/L
CREAT SERPL-MCNC: 0.72 MG/DL
FERRITIN SERPL-MCNC: 350 NG/ML
FOLATE SERPL-MCNC: 15 NG/ML
GLUCOSE SERPL-MCNC: 107 MG/DL
IRON SATN MFR SERPL: 33 %
IRON SERPL-MCNC: 91 UG/DL
POTASSIUM SERPL-SCNC: 4.5 MMOL/L
PROT SERPL-MCNC: 7.6 G/DL
SODIUM SERPL-SCNC: 140 MMOL/L
TIBC SERPL-MCNC: 277 UG/DL
UIBC SERPL-MCNC: 186 UG/DL
VIT B12 SERPL-MCNC: 670 PG/ML

## 2021-06-27 NOTE — ED ADULT NURSE NOTE - NS PRO PASSIVE SMOKE EXP
[address] [facility]  EMERGENCY DEPARTMENT ENCOUNTER        Pt Name: Jaspreet Mabry  MRN: [id number]  [place] 1981  Date of evaluation: 6/26/2021  Provider: Neida Evans PA-C  PCP: Jenni Lloyd MD    Shared Visit or Autonomous Visit: TATUM. I have evaluated this patient. My supervising physician was available for consultation. CHIEF COMPLAINT       Chief Complaint   Patient presents with    Foot Injury     pt states dropped bottle on right foot and is hurting, states shooting pains going down to toes. some bruising to side of right foot. HISTORY OF PRESENT ILLNESS   (Location/Symptom, Timing/Onset, Context/Setting, Quality, Duration, Modifying Factors, Severity)  Note limiting factors. Jaspreet Mabry is a 35 y.o. female presenting to the emergency department for evaluation of right foot pain and injury. She dropped a wine cooler bottle onto her right foot today having pain bruising and swelling to the right lateral foot sending shooting pains into her toes. The history is provided by the patient. Foot Problem  Location:  Foot  Injury: yes    Foot location:  R foot  Pain details:     Quality:  Throbbing    Onset quality:  Sudden  Chronicity:  New  Worsened by:  Bearing weight, extension and flexion  Associated symptoms: swelling and tingling    Associated symptoms: no fever        Nursing Notes were reviewed    REVIEW OF SYSTEMS    (2-9 systems for level 4, 10 or more for level 5)     Review of Systems   Constitutional: Negative for fever. Musculoskeletal:        Rt foot pain   Skin: Negative for wound. Positives and Pertinent negatives as per HPI.        PAST MEDICAL HISTORY     Past Medical History:   Diagnosis Date    Anemia     Asthma     Ear infection     Gestational diabetes     History of chicken pox     Hypothyroidism     Seasonal allergies     Thyroid disease     hypothyroidism         SURGICAL HISTORY     Past Surgical History:   Procedure Laterality Date    CHOLECYSTECTOMY  2006    POCT GLUCOSE  1/16/2011         TOOTH EXTRACTION  2006         CURRENTMEDICATIONS       Previous Medications    ALBUTEROL SULFATE  (90 BASE) MCG/ACT INHALER    INHALE 1 TO 2 PUFFS BY MOUTH EVERY 4 HOURS AS NEEDED FOR WHEEZING AND FOR SHORTNESS OF BREATH    ELASTIC BANDAGES & SUPPORTS (CARPAL TUNNEL WRIST STABILIZER) MISC    1 each by Does not apply route daily Right wrist    ERGOCALCIFEROL 62.5 MCG (2500 UT) CAPS    Take 2 capsules by mouth daily    FLUTICASONE-SALMETEROL (ADVAIR) 500-50 MCG/DOSE DISKUS INHALER    INHALE 1 DOSE BY MOUTH EVERY 12 HOURS    LEVOTHYROXINE (EUTHYROX) 150 MCG TABLET    TAKE 2 TABLETS BY MOUTH DAILY    MONTELUKAST (SINGULAIR) 10 MG TABLET    Take 1 tablet by mouth daily    PREDNISONE (DELTASONE) 10 MG TABLET    Take 6tab by mouth x2 days, then 5tab x2 days, then 4tabs x2 days, then 3tab x2 days, then 2tab x2 days, then 1tab x2 days. SPACER/AERO-HOLDING CHAMBERS (VORTEX VALVED HOLDING CHAMBER) LACY    1 each by Does not apply route every 4 hours as needed (shortness of breath)         ALLERGIES     Patient has no known allergies.     FAMILYHISTORY       Family History   Problem Relation Age of Onset    Diabetes Mother     High Blood Pressure Mother     Cancer Father           SOCIAL HISTORY       Social History     Socioeconomic History    Marital status: Single     Spouse name: None    Number of children: None    Years of education: None    Highest education level: None   Occupational History    None   Tobacco Use    Smoking status: Never Smoker    Smokeless tobacco: Never Used   Substance and Sexual Activity    Alcohol use: No    Drug use: No    Sexual activity: Yes     Partners: Male   Other Topics Concern    None   Social History Narrative    None     Social Determinants of Health     Financial Resource Strain: Low Risk     Difficulty of Paying Living Expenses: Not hard at all   Food Insecurity: No Food Insecurity  Worried About Running Out of Food in the Last Year: Never true    Kavitha of Food in the Last Year: Never true   Transportation Needs: No Transportation Needs    Lack of Transportation (Medical): No    Lack of Transportation (Non-Medical): No   Physical Activity:     Days of Exercise per Week:     Minutes of Exercise per Session:    Stress:     Feeling of Stress :    Social Connections:     Frequency of Communication with Friends and Family:     Frequency of Social Gatherings with Friends and Family:     Attends Synagogue Services:     Active Member of Clubs or Organizations:     Attends Club or Organization Meetings:     Marital Status:    Intimate Partner Violence:     Fear of Current or Ex-Partner:     Emotionally Abused:     Physically Abused:     Sexually Abused:        SCREENINGS             PHYSICAL EXAM    (up to 7 for level 4, 8 or more for level 5)     ED Triage Vitals [06/26/21 2134]   BP Temp Temp Source Pulse Resp SpO2 Height Weight   127/79 98 °F (36.7 °C) Oral 71 16 99 % 4' 11\" (1.499 m) 204 lb (92.5 kg)       Physical Exam  Vitals and nursing note reviewed. Constitutional:       Appearance: She is well-developed. She is not ill-appearing or toxic-appearing. HENT:      Head: Normocephalic and atraumatic. Cardiovascular:      Pulses: Normal pulses. Dorsalis pedis pulses are 2+ on the right side. Posterior tibial pulses are 2+ on the right side. Pulmonary:      Effort: Pulmonary effort is normal. No respiratory distress. Musculoskeletal:      Right foot: Normal capillary refill. Tenderness present. No laceration. Normal pulse. Feet:    Skin:     General: Skin is warm and dry. Capillary Refill: Capillary refill takes less than 2 seconds. Neurological:      Mental Status: She is alert and oriented to person, place, and time. Sensory: Sensation is intact. Motor: Motor function is intact. No abnormal muscle tone.    Psychiatric: ibuprofen (ADVIL;MOTRIN) tablet 600 mg (600 mg Oral Given 6/26/21 2229)       10:25 PM EDT  X-ray obtained. Negative for fracture. No dislocation. Impression right foot contusion. Advise rest ice elevate Tylenol and ibuprofen for pain. Postop shoe crutches will be provided. Orthopedic referral as needed. I estimate there is LOW risk for  COMPARTMENT SYNDROME, SEPTIC ARTHRITIS, TENDON OR NEUROVASCULAR INJURY, thus I consider the discharge disposition reasonable. FINAL IMPRESSION      1.  Contusion of right foot, initial encounter          DISPOSITION/PLAN   DISPOSITION Decision to Discharge    PATIENT REFERREDTO:  Padilla West MD  [facility] [address] [facility] [address]  [phone]    In 1 week      Brendan Rich [address] [facility] orthopedics as needed      DISCHARGE MEDICATIONS:  New Prescriptions    No medications on file       DISCONTINUED MEDICATIONS:  Discontinued Medications    No medications on file              (Please note that portions ofthis note were completed with a voice recognition program.  Efforts were made to edit the dictations but occasionally words are mis-transcribed.)    Cynthia Khan PA-C (electronically signed)           Maira Stephens PA-C  06/26/21 2020 No

## 2021-08-20 ENCOUNTER — OUTPATIENT (OUTPATIENT)
Dept: OUTPATIENT SERVICES | Facility: HOSPITAL | Age: 51
LOS: 1 days | Discharge: HOME | End: 2021-08-20

## 2021-08-20 DIAGNOSIS — Z98.84 BARIATRIC SURGERY STATUS: Chronic | ICD-10-CM

## 2021-08-20 DIAGNOSIS — D64.9 ANEMIA, UNSPECIFIED: ICD-10-CM

## 2021-08-21 ENCOUNTER — RESULT REVIEW (OUTPATIENT)
Age: 51
End: 2021-08-21

## 2021-08-23 ENCOUNTER — RESULT REVIEW (OUTPATIENT)
Age: 51
End: 2021-08-23

## 2021-08-23 ENCOUNTER — APPOINTMENT (OUTPATIENT)
Dept: HEMATOLOGY ONCOLOGY | Facility: CLINIC | Age: 51
End: 2021-08-23
Payer: MEDICAID

## 2021-08-23 VITALS
BODY MASS INDEX: 23.71 KG/M2 | HEIGHT: 62.24 IN | WEIGHT: 130.51 LBS | TEMPERATURE: 97.3 F | OXYGEN SATURATION: 98 % | SYSTOLIC BLOOD PRESSURE: 144 MMHG | DIASTOLIC BLOOD PRESSURE: 91 MMHG | RESPIRATION RATE: 17 BRPM | HEART RATE: 91 BPM

## 2021-08-23 LAB
BASOPHILS # BLD AUTO: 0.07 K/UL — SIGNIFICANT CHANGE UP (ref 0–0.2)
BASOPHILS NFR BLD AUTO: 0.9 % — SIGNIFICANT CHANGE UP (ref 0–2)
EOSINOPHIL # BLD AUTO: 0.13 K/UL — SIGNIFICANT CHANGE UP (ref 0–0.5)
EOSINOPHIL NFR BLD AUTO: 1.6 % — SIGNIFICANT CHANGE UP (ref 0–6)
HCT VFR BLD CALC: 31.8 % — LOW (ref 34.5–45)
HGB BLD-MCNC: 9.9 G/DL — LOW (ref 11.5–15.5)
IMM GRANULOCYTES NFR BLD AUTO: 0.4 % — SIGNIFICANT CHANGE UP (ref 0–1.5)
LYMPHOCYTES # BLD AUTO: 2.36 K/UL — SIGNIFICANT CHANGE UP (ref 1–3.3)
LYMPHOCYTES # BLD AUTO: 28.7 % — SIGNIFICANT CHANGE UP (ref 13–44)
MCHC RBC-ENTMCNC: 18.7 PG — LOW (ref 27–34)
MCHC RBC-ENTMCNC: 31.1 G/DL — LOW (ref 32–36)
MCV RBC AUTO: 60 FL — LOW (ref 80–100)
MONOCYTES # BLD AUTO: 0.5 K/UL — SIGNIFICANT CHANGE UP (ref 0–0.9)
MONOCYTES NFR BLD AUTO: 6.1 % — SIGNIFICANT CHANGE UP (ref 2–14)
NEUTROPHILS # BLD AUTO: 5.12 K/UL — SIGNIFICANT CHANGE UP (ref 1.8–7.4)
NEUTROPHILS NFR BLD AUTO: 62.3 % — SIGNIFICANT CHANGE UP (ref 43–77)
NRBC # BLD: 0 /100 WBCS — SIGNIFICANT CHANGE UP (ref 0–0)
PLATELET # BLD AUTO: 499 K/UL — HIGH (ref 150–400)
RBC # BLD: 5.3 M/UL — HIGH (ref 3.8–5.2)
RBC # FLD: 17.2 % — HIGH (ref 10.3–14.5)
RETICS #: 80.2 K/UL — SIGNIFICANT CHANGE UP (ref 25–125)
RETICS/RBC NFR: 1.5 % — SIGNIFICANT CHANGE UP (ref 0.5–2.5)
WBC # BLD: 8.21 K/UL — SIGNIFICANT CHANGE UP (ref 3.8–10.5)
WBC # FLD AUTO: 8.21 K/UL — SIGNIFICANT CHANGE UP (ref 3.8–10.5)

## 2021-08-23 PROCEDURE — 99214 OFFICE O/P EST MOD 30 MIN: CPT

## 2021-08-25 NOTE — HISTORY OF PRESENT ILLNESS
[de-identified] : Patient fell about a month ago, had numbness going down the leg, was refereed to a neurologist but when she lidia to her appointment was told that the neurologist retired.  \par \par Hx of thalassemia minor, baseline Hg was in the 10.5-11.0 g/dl max, steady microcytosis with MCV 60.  \par \par Patient went into menopause about 2 years ago.  \par Hx of beta thal minor.  Was given empyric IV venofer  May 27th. Did feel a bit better in the month of June.  Unclear how high the Hg went following this.  Was difficult to gauge what her baseline Hg was but at some poitn the max witnessed was 11.1g/dl in 2015.  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 She is very concerned about relapsing anemia during the winter into the spring tax season.

## 2021-08-27 LAB
ALBUMIN SERPL ELPH-MCNC: 4.7 G/DL
ALP BLD-CCNC: 81 U/L
ALT SERPL-CCNC: 15 U/L
ANION GAP SERPL CALC-SCNC: 12 MMOL/L
AST SERPL-CCNC: 22 U/L
BILIRUB SERPL-MCNC: 0.4 MG/DL
BUN SERPL-MCNC: 15 MG/DL
CALCIUM SERPL-MCNC: 10.4 MG/DL
CHLORIDE SERPL-SCNC: 100 MMOL/L
CO2 SERPL-SCNC: 24 MMOL/L
CREAT SERPL-MCNC: 0.71 MG/DL
FERRITIN SERPL-MCNC: 291 NG/ML
GLUCOSE SERPL-MCNC: 114 MG/DL
IRON SATN MFR SERPL: 22 %
IRON SERPL-MCNC: 60 UG/DL
POTASSIUM SERPL-SCNC: 4.6 MMOL/L
PROT SERPL-MCNC: 7.4 G/DL
SODIUM SERPL-SCNC: 137 MMOL/L
TIBC SERPL-MCNC: 273 UG/DL
UIBC SERPL-MCNC: 214 UG/DL

## 2021-11-15 ENCOUNTER — OUTPATIENT (OUTPATIENT)
Dept: OUTPATIENT SERVICES | Facility: HOSPITAL | Age: 51
LOS: 1 days | Discharge: HOME | End: 2021-11-15

## 2021-11-15 DIAGNOSIS — D64.9 ANEMIA, UNSPECIFIED: ICD-10-CM

## 2021-11-15 DIAGNOSIS — Z98.84 BARIATRIC SURGERY STATUS: Chronic | ICD-10-CM

## 2021-11-19 ENCOUNTER — APPOINTMENT (OUTPATIENT)
Dept: HEMATOLOGY ONCOLOGY | Facility: CLINIC | Age: 51
End: 2021-11-19

## 2023-05-18 NOTE — ASSESSMENT
[FreeTextEntry1] : This citlali 51 year old woman with a history of iron deficiency, severe fatigue.  Was treated wit HIV iron ferritin thee as expected to 350, slight iron overload likely exacerbated by history of beta thal minor. Despite adequate iron being given we never really witnessed her Hg improving much over 10.5g/dl and MCV 60.  The remaining anemia likely secondary to her underlying thalassemia.  Baseline hg is likely lower than normal 10.5g/dl to 11.1g/dl, and is slightly lower than that right now at 9.9g/dl without symptoms. Recommended that she get 3 doses of IV venofer yearly, next dose in December right before her busy season at her workplace.  WOuld estimate that it would take 3 doses per year to maintain her optimal hemoglobin.  
Clothing

## 2023-09-12 NOTE — PATIENT PROFILE ADULT. - NS PRO PT REFERRAL QUES 2 YN
I called the dental office to confirm they have the list.  They didn't receive it.  I currently have 2 confirmed sent.  Requested it be sent again to same fax number.  Faxed again   no
